# Patient Record
Sex: FEMALE | Race: WHITE
[De-identification: names, ages, dates, MRNs, and addresses within clinical notes are randomized per-mention and may not be internally consistent; named-entity substitution may affect disease eponyms.]

---

## 2017-04-26 ENCOUNTER — HOSPITAL ENCOUNTER (OUTPATIENT)
Dept: HOSPITAL 45 - C.MAMM | Age: 77
Discharge: HOME | End: 2017-04-26
Attending: OBSTETRICS & GYNECOLOGY
Payer: COMMERCIAL

## 2017-04-26 DIAGNOSIS — Z12.31: Primary | ICD-10-CM

## 2017-04-26 NOTE — MAMMOGRAPHY REPORT
BILATERAL DIGITAL SCREENING MAMMOGRAM WITH CAD: 4/26/2017

CLINICAL HISTORY: Routine screening.  Patient has no complaints.  





TECHNIQUE: Bilateral CC and MLO views were obtained.  Current study was also evaluated with a Comput
er Aided Detection (CAD) system.  



COMPARISON: Comparison is made to exams dated:  4/25/2016 mammogram, 4/23/2015 mammogram, 5/3/2013 m
ammogram, 4/22/2014 mammogram, 4/19/2013 mammogram, and 4/18/2012 mammogram - [facility].   



BREAST COMPOSITION:  The tissue of both breasts is heterogeneously dense, which may obscure small ma
sses.  



FINDINGS: There are mild vascular calcifications in the breasts.  Scattered benign coarse calcificat
ions and punctate benign-appearing microcalcifications.  No obvious new mass, architectural distorti
on or cluster of suspicious microcalcifications is seen.  



IMPRESSION:  ACR BI-RADS CATEGORY 1: NEGATIVE

There is no mammographic evidence of malignancy. A 1 year screening mammogram is recommended.  The p
atient will receive written notification of the results.  





Approximately 10% of breast cancers are not detected with mammography. A negative mammographic repor
t should not delay biopsy if a clinically suggestive mass is present.



Randi Peterson M.D.          

ay/:4/26/2017 16:08:53  



Imaging Technologist: Uche GUERRA)(M), [facility]

letter sent: Normal 1/2  

BI-RADS Code: ACR BI-RADS Category 1: Negative

## 2017-05-18 ENCOUNTER — HOSPITAL ENCOUNTER (OUTPATIENT)
Dept: HOSPITAL 45 - C.ULTR | Age: 77
Discharge: HOME | End: 2017-05-18
Attending: FAMILY MEDICINE
Payer: COMMERCIAL

## 2017-05-18 DIAGNOSIS — R59.1: ICD-10-CM

## 2017-05-18 DIAGNOSIS — Z12.11: Primary | ICD-10-CM

## 2017-05-18 DIAGNOSIS — I10: ICD-10-CM

## 2017-05-18 DIAGNOSIS — E78.00: ICD-10-CM

## 2017-05-18 NOTE — DIAGNOSTIC IMAGING REPORT
NECK ULTRASONOGRAPHY



CLINICAL HISTORY: Lymphadenopathy    



COMPARISON STUDY:  No previous studies for comparison.



FINDINGS: There are prominent bilateral cervical lymph nodes with borderline

thickened cortices. The largest on the right measures 17 x 6 x 5 mm. The largest

on the left measures 19 x 8 x 7 mm.



IMPRESSION:  Mild bilateral cervical lymphadenopathy. The lymph nodes

demonstrate  mildly thickened cortices. 









Electronically signed by:  Benson Patel M.D.

5/18/2017 12:20 PM



Dictated Date/Time:  5/18/2017 12:18 PM

## 2018-01-17 ENCOUNTER — HOSPITAL ENCOUNTER (OUTPATIENT)
Dept: HOSPITAL 45 - C.LABSPEC | Age: 78
Discharge: HOME | End: 2018-01-17
Attending: UROLOGY
Payer: COMMERCIAL

## 2018-01-17 DIAGNOSIS — N39.41: ICD-10-CM

## 2018-01-17 DIAGNOSIS — N39.0: Primary | ICD-10-CM

## 2018-02-14 ENCOUNTER — HOSPITAL ENCOUNTER (OUTPATIENT)
Dept: HOSPITAL 45 - C.LAB | Age: 78
Discharge: HOME | End: 2018-02-14
Attending: NURSE PRACTITIONER
Payer: COMMERCIAL

## 2018-02-14 DIAGNOSIS — N39.41: ICD-10-CM

## 2018-02-14 DIAGNOSIS — N39.0: Primary | ICD-10-CM

## 2018-05-01 ENCOUNTER — HOSPITAL ENCOUNTER (OUTPATIENT)
Dept: HOSPITAL 45 - C.MAMM | Age: 78
Discharge: HOME | End: 2018-05-01
Attending: OBSTETRICS & GYNECOLOGY
Payer: COMMERCIAL

## 2018-05-01 DIAGNOSIS — Z12.31: Primary | ICD-10-CM

## 2023-09-29 NOTE — MAMMOGRAPHY REPORT
BILATERAL DIGITAL SCREENING MAMMOGRAM TOMOSYNTHESIS WITH CAD: 5/1/2018

CLINICAL HISTORY: Routine screening.  Patient has no complaints.  





TECHNIQUE:  Breast tomosynthesis in addition to standard 2D mammography was performed. Current study 
was also evaluated with a Computer Aided Detection (CAD) system.  



COMPARISON: Comparison is made to exams dated:  4/26/2017 mammogram, 4/25/2016 mammogram, 4/23/2015 m
ammogram, 4/22/2014 mammogram, 5/3/2013 mammogram, and 4/19/2013 mammogram - [facility]
nter.   



BREAST COMPOSITION:  The tissue of both breasts is heterogeneously dense, which may obscure small mas
ses.  



FINDINGS:  No suspicious mass, architectural distortion or cluster of microcalcifications is seen.  T
here are moderate vascular calcifications and scattered rounded rim calcifications bilaterally.

IMPRESSION:  ACR BI-RADS CATEGORY 1: NEGATIVE

There is no mammographic evidence of malignancy. A 1 year screening mammogram is recommended.  The pa
tient will receive written notification of the results.  





Approximately 10% of breast cancers are not detected with mammography. A negative mammographic report
 should not delay biopsy if a clinically suggestive mass is present.



Randi Peterson M.D.          

ay/:5/1/2018 15:04:52  



Imaging Technologist: Landy BOYCE(R)(M), [facility]

letter sent: Normal 1/2  

BI-RADS Code: ACR BI-RADS Category 1: Negative 10